# Patient Record
Sex: FEMALE | Race: WHITE | Employment: UNEMPLOYED | URBAN - METROPOLITAN AREA
[De-identification: names, ages, dates, MRNs, and addresses within clinical notes are randomized per-mention and may not be internally consistent; named-entity substitution may affect disease eponyms.]

---

## 2019-07-26 ENCOUNTER — HOSPITAL ENCOUNTER (EMERGENCY)
Age: 39
Discharge: HOME | End: 2019-07-26
Attending: EMERGENCY MEDICINE
Payer: SELF-PAY

## 2019-07-26 VITALS
HEIGHT: 64 IN | HEART RATE: 88 BPM | RESPIRATION RATE: 20 BRPM | DIASTOLIC BLOOD PRESSURE: 92 MMHG | BODY MASS INDEX: 29.02 KG/M2 | WEIGHT: 170 LBS | SYSTOLIC BLOOD PRESSURE: 128 MMHG | OXYGEN SATURATION: 98 % | TEMPERATURE: 98 F

## 2019-07-26 DIAGNOSIS — K14.6 TONGUE PAIN: Primary | ICD-10-CM

## 2019-07-26 PROCEDURE — 96372 THER/PROPH/DIAG INJ SC/IM: CPT | Performed by: EMERGENCY MEDICINE

## 2019-07-26 PROCEDURE — 74011250636 HC RX REV CODE- 250/636: Performed by: EMERGENCY MEDICINE

## 2019-07-26 PROCEDURE — 99283 EMERGENCY DEPT VISIT LOW MDM: CPT | Performed by: EMERGENCY MEDICINE

## 2019-07-26 PROCEDURE — 74011250637 HC RX REV CODE- 250/637: Performed by: EMERGENCY MEDICINE

## 2019-07-26 RX ORDER — KETOROLAC TROMETHAMINE 30 MG/ML
30 INJECTION, SOLUTION INTRAMUSCULAR; INTRAVENOUS
Status: COMPLETED | OUTPATIENT
Start: 2019-07-26 | End: 2019-07-26

## 2019-07-26 RX ADMIN — Medication 5 ML: at 05:18

## 2019-07-26 RX ADMIN — KETOROLAC TROMETHAMINE 30 MG: 30 INJECTION, SOLUTION INTRAMUSCULAR; INTRAVENOUS at 05:18

## 2019-07-26 NOTE — ED TRIAGE NOTES
Pt complains of lesions and sores on her tongue for the past 8 months. Pt states she has tried oralgel, and lidocaine with no relief.

## 2019-07-26 NOTE — ED NOTES
I have reviewed discharge instructions with the patient. The patient verbalized understanding. Patient left ED via Discharge Method: ambulatory to Home with self. Opportunity for questions and clarification provided. Patient given 1 scripts. To continue your aftercare when you leave the hospital, you may receive an automated call from our care team to check in on how you are doing. This is a free service and part of our promise to provide the best care and service to meet your aftercare needs.  If you have questions, or wish to unsubscribe from this service please call [phone]. Thank you for Choosing our [facility] Emergency Department.

## 2019-07-26 NOTE — ED PROVIDER NOTES
79 Shemar Haddad is a 34 y.o. female seen on 7/26/2019 at 4:51 AM in the [facility] EMERGENCY DEPT in room ER18/18. Chief Complaint   Patient presents with    Dental Pain     HPI:  23-year-old female presenting to the emergency department complaining of tongue discomfort. She states she's been having these symptoms intermittently for the last 8 months. She describes a burning sensation over her entire tongue and she is concerned that she has bumps on her tongue. She has a sore on the left side of her tongue that is particularly painful. She states the symptoms will get worse and then ease off. She is also concerned because her mother was diagnosed with tongue cancer and she thinks her symptoms began similarly. She does not have any sores on her lip or mouth. She has had 2 or 3 episodes similar to this over the last 8 months    Historian: Patient    REVIEW OF SYSTEMS     Review of Systems   HENT: Positive for mouth sores. Negative for dental problem, ear discharge, ear pain and facial swelling.         PAST MEDICAL HISTORY     Past Medical History:   Diagnosis Date    Chronic kidney disease     stones    Infectious disease     hep c    Other ill-defined conditions(799.89)     Chronic pain    Psychiatric disorder     depression, detox & court ordered rehab currently    Seizures ([facility] [address]     pt states, not in hx    Unspecified diseases of blood and blood-forming organs     hep c     Past Surgical History:   Procedure Laterality Date    HX GYN  l fallopian tube removal    BTL 8/10    HX OTHER SURGICAL  1998    tube removal for ectopic     Social History     Socioeconomic History    Marital status:      Spouse name: Not on file    Number of children: Not on file    Years of education: Not on file    Highest education level: Not on file   Tobacco Use    Smoking status: Current Every Day Smoker     Packs/day: 1.00    Smokeless tobacco: Never Used Substance and Sexual Activity    Alcohol use: Yes     Comment: occassionally    Drug use: No     Comment: curently in detox/ rehab    Sexual activity: Yes     Partners: Male     Prior to Admission Medications   Prescriptions Last Dose Informant Patient Reported? Taking?   citalopram (CELEXA) 10 mg tablet   Yes No   Sig: Take 20 mg by mouth daily. clonazePAM (KLONOPIN) 1 mg tablet   Yes No   Sig: Take  by mouth three (3) times daily. Facility-Administered Medications: None     No Known Allergies     PHYSICAL EXAM       Vitals:    07/26/19 0425 07/26/19 0445   BP: (!) 131/92    Pulse: 95    Resp: 16    Temp: 97.9 °F (36.6 °C)    SpO2: 98% 98%    Vital signs were reviewed. Physical Exam   Constitutional: She is oriented to person, place, and time. She appears well-developed and well-nourished. No distress. HENT:   Head: Normocephalic and atraumatic. Mouth/Throat: Uvula is midline, oropharynx is clear and moist and mucous membranes are normal. Normal dentition. No dental caries. No tonsillar exudate. Small 5mm x 5mm white area on L lateral tongue   Eyes: Pupils are equal, round, and reactive to light. EOM are normal.   Neck: Normal range of motion. Cardiovascular: Intact distal pulses. Pulmonary/Chest: Effort normal.   Musculoskeletal: Normal range of motion. Neurological: She is alert and oriented to person, place, and time. Psychiatric: She has a normal mood and affect. Her behavior is normal.        MEDICAL DECISION MAKING     Differential Diagnosis: Aphthous ulcer, herpetic lesion, leukoplakia,    ED Course:  Lesion does not appear consistent with herpetic lesion. This could represent aphthous ulcer. However given her concerns about cancer and her family history I have referred her to ENT for further evaluation. We'll give Magic mouthwash for symptomatic relief.     Disposition:  Discharge home  Diagnosis:  Tongue pain  ____________________________________________________________________  A portion of this note was generated using voice recognition dictation software. While the note has been reviewed for accuracy, please note certain words and phrases may not be transcribed as intended and some grammatical and/or typographical errors may be present.